# Patient Record
Sex: MALE | URBAN - METROPOLITAN AREA
[De-identification: names, ages, dates, MRNs, and addresses within clinical notes are randomized per-mention and may not be internally consistent; named-entity substitution may affect disease eponyms.]

---

## 2017-01-06 ENCOUNTER — OFFICE VISIT (OUTPATIENT)
Dept: UROLOGY | Facility: CLINIC | Age: 62
End: 2017-01-06

## 2017-01-06 VITALS
DIASTOLIC BLOOD PRESSURE: 84 MMHG | BODY MASS INDEX: 25.4 KG/M2 | WEIGHT: 171.5 LBS | HEART RATE: 44 BPM | HEIGHT: 69 IN | SYSTOLIC BLOOD PRESSURE: 144 MMHG

## 2017-01-06 DIAGNOSIS — R35.1 NOCTURIA: Primary | ICD-10-CM

## 2017-01-06 RX ORDER — ALFUZOSIN HYDROCHLORIDE 10 MG/1
10 TABLET, EXTENDED RELEASE ORAL DAILY
Qty: 90 TABLET | Refills: 1 | Status: SHIPPED | OUTPATIENT
Start: 2017-01-06 | End: 2017-07-10

## 2017-01-06 ASSESSMENT — PAIN SCALES - GENERAL: PAINLEVEL: NO PAIN (0)

## 2017-01-06 ASSESSMENT — ENCOUNTER SYMPTOMS
EYE REDNESS: 1
DOUBLE VISION: 0
EYE PAIN: 0
EYE IRRITATION: 1
DIFFICULTY URINATING: 1
EYE WATERING: 0
HEMATURIA: 0
FLANK PAIN: 0
DYSURIA: 0

## 2017-01-06 NOTE — Clinical Note
"1/6/2017       RE: Damian Brooks  [address] [place] [address]     Dear Colleague,    Thank you for referring your patient, Damian Brooks, to the [facility] UROLOGY AND INST FOR PROSTATE AND UROLOGIC CANCERS at [facility]. Please see a copy of my visit note below.    It was my pleasure to see Damian Brooks a 61 year old year old male today. Patient was seen in consultation for nocturia.    HPI:   Patient presents with a history of lower urinary tract symptoms       Nocturia 3-5x   Frequency, urgency, no UUI   Had discussed green light laser vaporization and uro-lift with previous urologist.     Patient has tried tamsulosin in the past and noted no difference in symptoms. Also did not like decreased ejaculate.   He is interested in Rezum.     We discussed TURP vs PVP vs uro-lift vs rezum.   Uro-lift and Rezum would be good options given maintenance of ejaculatory function.     We also discussed cystoscopy and possible UDS as part of work up in preparation for outlet procedure.   Patient defers UDS but will have cystoscopy closer to the time of procedure.         No past medical history on file.    No past surgical history on file.    No family history on file.    No current outpatient prescriptions on file.       ALLERGIES: Review of patient's allergies indicates no known allergies.      REVIEW OF SYSTEMS:  Skin: negative  Eyes: negative  Ears/Nose/Throat: negative  Respiratory: No shortness of breath, dyspnea on exertion, cough, or hemoptysis  Cardiovascular: negative  Gastrointestinal: negative  Genitourinary: as above  Musculoskeletal: negative  Neurologic: negative  Psychiatric: negative  Hematologic/Lymphatic/Immunologic: negative  Endocrine: negative      GENERAL PHYSICAL EXAM:   Vitals: /84 mmHg  Pulse 44  Ht 1.753 m (5' 9\")  Wt 77.792 kg (171 lb 8 oz)  BMI 25.31 kg/m2  Body mass index is 25.31 kg/(m^2).  Constitutional: healthy, alert and no " distress  Head: Normocephalic. No masses, lesions, tenderness or abnormalities  Neck: Neck supple. No adenopathy. Thyroid symmetric, normal size,, Carotids without bruits.  ENT: ENT exam normal, no neck nodes or sinus tenderness  Cardiovascular: negative  Respiratory: negative  Gastrointestinal: Abdomen soft  Musculoskeletal: extremities normal-  Skin: no suspicious lesions or rashes  Psychiatric: affect normal/bright and mentation appears normal.       EXAM:   Left Flank: negative  Right Flank: negative  Inguinal Area: normal        Urinary Flow Rate  Peak Flow: 2.8 mL/s  Average Flow: 1.1 mL/s  Voided (mL): 43 mL  Residual Volume by Ultrasound: 37 mL    RADIOLOGY: The following tests were reviewed: Need to complete the following Radiology exams prior to the office appointment:  LABS: The last test results for Needs to complete the necessary tests prior to appointment: were reviewed.     ASSESSMENT: 62 y/o M with lower urinary tract symptoms refractory to tamsulosin     PLAN:   Trial of alfuzosin   Patient will defer Rezum at present but will contact us in a few months to initiate cystoscopy and set up treatment         I spent over 30 minutes with the patient.  Over half this time was spent on counseling.          Again, thank you for allowing me to participate in the care of your patient.      Sincerely,    Liliam Merritt MD

## 2017-01-06 NOTE — NURSING NOTE
"Chief Complaint   Patient presents with     Consult     2nd opionion regarding BPH       Blood pressure 144/84, pulse 44, height 1.753 m (5' 9\"), weight 77.792 kg (171 lb 8 oz). Body mass index is 25.31 kg/(m^2).    There is no problem list on file for this patient.      No Known Allergies    No current outpatient prescriptions on file.       Social History   Substance Use Topics     Smoking status: Never Smoker      Smokeless tobacco: Never Used     Alcohol Use: Not on file       AYESHA Henao  1/6/2017  12:58 PM       "

## 2017-01-06 NOTE — PROGRESS NOTES
"It was my pleasure to see Damian Brooks a 61 year old year old male today. Patient was seen in consultation for nocturia.    HPI:   Patient presents with a history of lower urinary tract symptoms       Nocturia 3-5x   Frequency, urgency, no UUI   Had discussed green light laser vaporization and uro-lift with previous urologist.     Patient has tried tamsulosin in the past and noted no difference in symptoms. Also did not like decreased ejaculate.   He is interested in Rezum.     We discussed TURP vs PVP vs uro-lift vs rezum.   Uro-lift and Rezum would be good options given maintenance of ejaculatory function.     We also discussed cystoscopy and possible UDS as part of work up in preparation for outlet procedure.   Patient defers UDS but will have cystoscopy closer to the time of procedure.         No past medical history on file.    No past surgical history on file.    No family history on file.    No current outpatient prescriptions on file.       ALLERGIES: Review of patient's allergies indicates no known allergies.      REVIEW OF SYSTEMS:  Skin: negative  Eyes: negative  Ears/Nose/Throat: negative  Respiratory: No shortness of breath, dyspnea on exertion, cough, or hemoptysis  Cardiovascular: negative  Gastrointestinal: negative  Genitourinary: as above  Musculoskeletal: negative  Neurologic: negative  Psychiatric: negative  Hematologic/Lymphatic/Immunologic: negative  Endocrine: negative      GENERAL PHYSICAL EXAM:   Vitals: /84 mmHg  Pulse 44  Ht 1.753 m (5' 9\")  Wt 77.792 kg (171 lb 8 oz)  BMI 25.31 kg/m2  Body mass index is 25.31 kg/(m^2).  Constitutional: healthy, alert and no distress  Head: Normocephalic. No masses, lesions, tenderness or abnormalities  Neck: Neck supple. No adenopathy. Thyroid symmetric, normal size,, Carotids without bruits.  ENT: ENT exam normal, no neck nodes or sinus tenderness  Cardiovascular: negative  Respiratory: negative  Gastrointestinal: Abdomen " soft  Musculoskeletal: extremities normal-  Skin: no suspicious lesions or rashes  Psychiatric: affect normal/bright and mentation appears normal.       EXAM:   Left Flank: negative  Right Flank: negative  Inguinal Area: normal        Urinary Flow Rate  Peak Flow: 2.8 mL/s  Average Flow: 1.1 mL/s  Voided (mL): 43 mL  Residual Volume by Ultrasound: 37 mL    RADIOLOGY: The following tests were reviewed: Need to complete the following Radiology exams prior to the office appointment:  LABS: The last test results for Needs to complete the necessary tests prior to appointment: were reviewed.     ASSESSMENT: 60 y/o M with lower urinary tract symptoms refractory to tamsulosin     PLAN:   Trial of alfuzosin   Patient will defer Rezum at present but will contact us in a few months to initiate cystoscopy and set up treatment         I spent over 30 minutes with the patient.  Over half this time was spent on counseling.

## 2017-07-10 DIAGNOSIS — R35.1 NOCTURIA: ICD-10-CM

## 2017-07-11 RX ORDER — ALFUZOSIN HYDROCHLORIDE 10 MG/1
TABLET, EXTENDED RELEASE ORAL
Qty: 90 TABLET | Refills: 1 | Status: SHIPPED | OUTPATIENT
Start: 2017-07-11 | End: 2018-03-16

## 2017-10-16 ENCOUNTER — PRE VISIT (OUTPATIENT)
Dept: UROLOGY | Facility: CLINIC | Age: 62
End: 2017-10-16

## 2017-10-16 NOTE — TELEPHONE ENCOUNTER
Patient is coming in to see  for a cystoscopy for  lower urinary tract symptoms, no need for a call

## 2017-10-20 ENCOUNTER — ALLIED HEALTH/NURSE VISIT (OUTPATIENT)
Dept: UROLOGY | Facility: CLINIC | Age: 62
End: 2017-10-20

## 2017-10-20 ENCOUNTER — OFFICE VISIT (OUTPATIENT)
Dept: UROLOGY | Facility: CLINIC | Age: 62
End: 2017-10-20

## 2017-10-20 VITALS
HEIGHT: 69 IN | DIASTOLIC BLOOD PRESSURE: 76 MMHG | SYSTOLIC BLOOD PRESSURE: 124 MMHG | WEIGHT: 171.4 LBS | HEART RATE: 45 BPM | BODY MASS INDEX: 25.39 KG/M2

## 2017-10-20 DIAGNOSIS — R39.9 LOWER URINARY TRACT SYMPTOMS (LUTS): Primary | ICD-10-CM

## 2017-10-20 RX ORDER — CEFAZOLIN SODIUM 1 G/3ML
1 INJECTION, POWDER, FOR SOLUTION INTRAMUSCULAR; INTRAVENOUS SEE ADMIN INSTRUCTIONS
Status: CANCELLED | OUTPATIENT
Start: 2017-10-20

## 2017-10-20 ASSESSMENT — PAIN SCALES - GENERAL
PAINLEVEL: NO PAIN (0)
PAINLEVEL: NO PAIN (0)

## 2017-10-20 NOTE — MR AVS SNAPSHOT
After Visit Summary   10/20/2017    Damian Brooks    MRN: [id number]           Patient Information     Date Of Birth          1955        Visit Information        Provider Department      10/20/2017 3:30 PM Nurse,  Prostate [facility] for Prostate and Urologic Cancers        Today's Diagnoses     Lower urinary tract symptoms (LUTS)    -  1       Follow-ups after your visit        Your next 10 appointments already scheduled     Dec 11, 2017   Procedure with Liliam Merritt MD   [facility] ([facility])    [address] [place] [address]  [address] [facility] [address]   [phone]           Located in the Clinics and Surgery Center at [address] [place] [address].   parking is very convenient and highly recommended.  is a $6 flat rate fee.  Both  and self parkers should enter the main arrival plaza from [facility]; parking attendants will direct you based on your parking preference.            Dec 14, 2017  9:30 AM CST   (Arrive by 9:15 AM)   Return Visit with  Prostate Cancer Ctr Nurse   [facility] Prostate and Urologic Cancers ([facility])    [address] [place] [address]  [address] [facility] [address]   [phone]            Dec 22, 2017  2:30 PM CST   (Arrive by 2:15 PM)   Post-Op with Liliam Merritt MD   [facility] Prostate and Urologic Cancers ([facility])    [address] [place] [id number]   [phone]            Mar 16, 2018 11:15 AM CDT   (Arrive by 11:00 AM)   Return Visit with Liliam Merritt MD   [facility] Prostate and Urologic Cancers ([facility])    [address] [place] [id number] [phone]              Who to contact     Please call your clinic at  [phone] to:    Ask questions about your health    Make or cancel appointments    Discuss your medicines    Learn about your test results    Speak to your doctor   If you have compliments or concerns about an experience at your clinic, or if you wish to file a complaint, please contact [facility] Patient Relations at [phone] or email us at [email or web address]         Additional Information About Your Visit        [employer] Information     [employer] gives you secure access to your electronic health record. If you see a primary care provider, you can also send messages to your care team and make appointments. If you have questions, please call your primary care clinic.  If you do not have a primary care provider, please call [phone] and they will assist you.      Popcorn5 is an electronic gateway that provides easy, online access to your medical records. With [employer], you can request a clinic appointment, read your test results, renew a prescription or communicate with your care team.     To access your existing account, please contact your [facility] Clinic or call [phone] for assistance.        Care EveryWhere ID     This is your Care EveryWhere ID. This could be used by other organizations to access your [facility] medical records  [id number]         Blood Pressure from Last 3 Encounters:   10/20/17 124/76   01/06/17 144/84    Weight from Last 3 Encounters:   10/20/17 77.7 kg (171 lb 6.4 oz)   01/06/17 77.8 kg (171 lb 8 oz)              Today, you had the following     No orders found for display       Primary Care Provider Fax #    Provider Not In System [phone]                Equal Access to Services     JI WISE : Hadii chemo [place] Sofanny, waaxda luqadaha, qaybta kaalmada elza gutierrez. So [facility] [phone].    ATENCIÓN: Si habla español, tiene a caal disposición servicios gratuitos de  asistencia lingüística. Nicholas al [phone].    We comply with applicable federal civil rights laws and [place] laws. We do not discriminate on the basis of race, color, national origin, age, disability, sex, sexual orientation, or gender identity.            Thank you!     Thank you for choosing [facility] UROLOGY AND [facility] FOR PROSTATE AND UROLOGIC CANCERS  for your care. Our goal is always to provide you with excellent care. Hearing back from our patients is one way we can continue to improve our services. Please take a few minutes to complete the written survey that you may receive in the mail after your visit with us. Thank you!             Your Updated Medication List - Protect others around you: Learn how to safely use, store and throw away your medicines at www.disposemymeds.org.          This list is accurate as of: 10/20/17  3:34 PM.  Always use your most recent med list.                   Brand Name Dispense Instructions for use Diagnosis    alfuzosin 10 MG 24 hr tablet    UROXATRAL    90 tablet    TAKE ONE TABLET BY MOUTH EVERY DAY    Nocturia

## 2017-10-20 NOTE — PROGRESS NOTES
PRE-PROCEDURE DIAGNOSIS:  Lower urinary tract symptoms   POST-PROCEDURE DIAGNOSIS: lower urinary tract symptoms   PROCEDURE: Cystoscopy  HISTORY: Damian Brooks is a 62 year old male with slowed stream and lower urinary tract symptoms   REVIEW OF OFFICE STUDIES (e.g., uroflow or PVR):   DESCRIPTION OF PROCEDURE: After informed consent was obtained, the patient was brought to the procedure room where he was placed in the supine position with all pressure points well padded.  The penis and scrotum were prepped and draped in a sterile fashion. A flexible cystoscope was introduced through a well-lubricated urethra. Anterior urethra strictures were absent.   The urinary sphincter was intact.  The prostate demonstrated mild/moderate bilobar hypertrophy with high median bar, very small median lob component.  Bladder neck was open.   Bladder significant for presence of the following:      Diverticuli: absent      Cellules: absent      Trabeculation: present 0-1      Tumors: absent      Stones: absent  The flexible cystoscope was removed and the findings were described to the patient.   ASSESSMENT AND PLAN: 62 year old male with lower urinary tract symptoms and prostate amenable for Rezum.   Will schedule for Rezum next available

## 2017-10-20 NOTE — PROGRESS NOTES
Pre Op Teaching Flowsheet       Pre and Post op Patient Education  Relevant Diagnosis:  Urinary frequency  Teaching Topic:  Pre and post op teaching for cystoscopy, Rezum steam vaporization of the prostate  Person Involved in teaching:  Damian Brooks      Motivation Level:  Asks Questions: Yes  Eager to Learn:  Yes  Cooperative: Yes  Receptive (willing/able to accept information):  Yes  Patient demonstrates understanding of the following:  Date and time of surgery:  12.11.17 at 0830  Location of surgery: [facility]- 5th Floor  History and Physical and any other testing necessary prior to surgery: Yes  Required time line for completion of History and Physical and any pre-op testing: Yes    NPO Guidelines: NPO after midnight    Patient demonstrates understanding of the following:  Pre-op bowel prep: no, not needed  Pre-op showering/scrub information with Hibiclens Soap: Yes  Medications to take the day of surgery:  Per PCP  Blood thinner medications discussed and when to stop (if applicable):  Yes  Diabetes medication management (if applicable):  N/A  Discussed pain control after surgery: pain scale, pain medications and pain management techniques  Infection Prevention: Patient demonstrates understanding of the following:  Patient instructed on hand hygiene:  Yes  Surgical procedure site care taught: N/A  Signs and symptoms of infection taught:  Yes  Wound care will be taught at the time of discharge.  Central venous catheter care will be taught at the time of discharge (if applicable).    Post-op follow-up:  Discussed how to contact the hospital, nurse, and clinic scheduling staff if necessary.    Instructional materials used/given/mailed:  [facility] Booklet, post op teaching sheet, Map, Soap, and arrival/location information.    Surgical instructions given to patient in clinic: Yes.    Instructional Materials given:  Before your surgery packet , Medications to avoid  before surgery , Showering or Bathing instructions before surgery  and What to expect after surgery    Post-op appointment/testing scheduled per MD orders: Yes, 12.14.17 at 0930, [place] for catheter removal.  12.22.17 at 1430 with Dr Merritt  3.16.18 at 1115 with Dr Merritt    Total time with patient: 10 minutes    Tova Mora RN-BC, BSN  Urology Care Coordinator

## 2017-10-20 NOTE — NURSING NOTE
Invasive Procedure Safety Checklist:    Procedure:     Action: Complete sections and checkboxes as appropriate.    Pre-procedure:  1. Patient ID Verified with 2 identifiers (Sonali and  or MRN) : YES    2. Procedure and site verified with patient/designee (when able) : YES    3. Accurate consent documentation in medical record : YES    4. H&P (or appropriate assessment) documented in medical record : YES  H&P must be up to 30 days prior to procedure an updated within 24 hours of                 Procedure as applicable.     5. Relevant diagnostic and radiology test results appropriately labeled and displayed as applicable : YES    6. Blood products, implants, devices, and/or special equipment available for the procedure as applicable : YES    7. Procedure site(s) marked with provider initials [Exclusions: None] : NO    8. Marking not required. Reason : Yes  Procedure does not require site marking    Time Out:     Time-Out performed immediately prior to starting procedure, including verbal and active participation of all team members addressing: YES    1. Correct patient identity.  2. Confirmed that the correct side and site are marked.  3. An accurate procedure to be done.  4. Agreement on the procedure to be done.  5. Correct patient position.  6. Relevant images and results are properly labeled and appropriately displayed.  7. The need to administer antibiotics or fluids for irrigation purposes during the procedure as applicable.  8. Safety precautions based on patient history or medication use.    During Procedure: Verification of correct person, site, and procedure occurs any time the responsibility for care of the patient is transferred to another member of the care team.

## 2017-10-20 NOTE — MR AVS SNAPSHOT
"              After Visit Summary   10/20/2017    Damian Brooks    MRN: [id number]           Patient Information     Date Of Birth          1955        Visit Information        Provider Department      10/20/2017 1:15 PM Liliam Merritt MD [facility] for Prostate and Urologic Cancers        Today's Diagnoses     Lower urinary tract symptoms (LUTS)    -  1      Care Instructions      AFTER YOUR CYSTOSCOPY        You have just completed a cystoscopy, or \"cysto\", which allowed your physician to learn more about your bladder (or to remove a stent placed after surgery). We suggest that you continue to avoid caffeine, fruit juice, and alcohol for the next 24 hours, however, you are encouraged to return to your normal activities.         A few things that are considered normal after your cystoscopy:     * Small amount of bleeding (or spotting) that clears within the next 24 hours     * Slight burning sensation with urination     * Sensation to of needing to avoid more frequently     * The feeling of \"air\" in your urine     * Mild discomfort that is relieved with Tylenol        Please contact our office promptly if you:     * Develop a fever above 101 degrees     * Are unable to urinate     * Develop bright red blood that does not stop     * Severe pain or swelling         Please contact our office with any concerns or questions @[facility].          Follow-ups after your visit        Additional Services     PAC Visit Referral (For [facility] Only)       Does this visit require an Anesthesia consult?  No -  If this visit is not for evaluation for co-morbidity (such as patient request due to anxiety), what is the purpose of the visit?: H and P     H&P done by:  N/A      Please be aware that coverage of these services is subject to the terms and limitations of your health insurance plan.  Call member services at your health plan with any benefit or coverage questions.      Please bring the following to " your appointment:  >>   Any x-rays, CTs or MRIs which have been performed.  Contact the facility where they were done to arrange for  prior to your scheduled appointment.  Any new CT, MRI or other procedures ordered by your specialist must be performed at a [facility] or coordinated by your clinic's referral office.    >>   List of current medications  >>   This referral request   >>   Any documents/labs given to you for this referral                  Follow-up notes from your care team     Return in 12 months (on 10/20/2018).      Your next 10 appointments already scheduled     Dec 11, 2017   Procedure with Liliam Merritt MD   [facility] ([facility])    [address] [place] [address]  [address] [facility] [id number] [phone]           Located in the Clinics and Surgery Center at [address] [place] [address].   parking is very convenient and highly recommended.  is a $6 flat rate fee.  Both  and self parkers should enter the main arrival plaza from [facility]; parking attendants will direct you based on your parking preference.            Dec 14, 2017  9:30 AM CST   (Arrive by 9:15 AM)   Return Visit with  Prostate Cancer Ctr Nurse   [facility] for Prostate and Urologic Cancers ([facility])    [address] [place] [address]  [address]th [facility] [address]   [phone]            Dec 22, 2017  2:30 PM CST   (Arrive by 2:15 PM)   Post-Op with Liliam Merritt MD   [facility] for Prostate and Urologic Cancers ([facility])    [address] [place] [address]  [address] [facility] [address]   [phone]            Mar 16, 2018 11:15 AM CDT   (Arrive by 11:00 AM)   Return Visit with Liliam Merritt MD   [facility] for Prostate and Urologic Cancers ([facility])    [address] [place] [address]  "Se  4th Floor  [facility] [id number] [phone]              Who to contact     Please call your clinic at [phone] to:    Ask questions about your health    Make or cancel appointments    Discuss your medicines    Learn about your test results    Speak to your doctor   If you have compliments or concerns about an experience at your clinic, or if you wish to file a complaint, please contact [facility] Patient Relations at [phone] or email us at [email or web address]         Additional Information About Your Visit        [employer] Information     [employer] gives you secure access to your electronic health record. If you see a primary care provider, you can also send messages to your care team and make appointments. If you have questions, please call your primary care clinic.  If you do not have a primary care provider, please call [phone] and they will assist you.      [employer] is an electronic gateway that provides easy, online access to your medical records. With [employer], you can request a clinic appointment, read your test results, renew a prescription or communicate with your care team.     To access your existing account, please contact your [facility] Clinic or call [phone] for assistance.        Care EveryWhere ID     This is your Care EveryWhere ID. This could be used by other organizations to access your [place] medical records  [id number]        Your Vitals Were     Pulse Height BMI (Body Mass Index)             45 1.753 m (5' 9\") 25.31 kg/m2          Blood Pressure from Last 3 Encounters:   10/20/17 124/76   01/06/17 144/84    Weight from Last 3 Encounters:   10/20/17 77.7 kg (171 lb 6.4 oz)   01/06/17 77.8 kg (171 lb 8 oz)              We Performed the Following     CYSTOURETHROSCOPY     PAC Visit Referral (For [facility] Only)     Zulema-Operative Worksheet  (Urology General)        Primary Care Provider Fax #    Provider Not " In System [phone]                Equal Access to Services     RAÚLDEE FRANDY : Hadii aad ku hadanantshahriar Jesus, glenroy cortés, kielza gaytan. So [facility] [phone].    ATENCIÓN: Si habla español, tiene a caal disposición servicios gratuitos de asistencia lingüística. Llame al [phone].    We comply with applicable federal civil rights laws and [place] laws. We do not discriminate on the basis of race, color, national origin, age, disability, sex, sexual orientation, or gender identity.            Thank you!     Thank you for choosing [facility] AND [facility] FOR PROSTATE AND UROLOGIC CANCERS  for your care. Our goal is always to provide you with excellent care. Hearing back from our patients is one way we can continue to improve our services. Please take a few minutes to complete the written survey that you may receive in the mail after your visit with us. Thank you!             Your Updated Medication List - Protect others around you: Learn how to safely use, store and throw away your medicines at www.disposemymeds.org.          This list is accurate as of: 10/20/17  4:57 PM.  Always use your most recent med list.                   Brand Name Dispense Instructions for use Diagnosis    alfuzosin 10 MG 24 hr tablet    UROXATRAL    90 tablet    TAKE ONE TABLET BY MOUTH EVERY DAY    Nocturia

## 2017-10-20 NOTE — LETTER
10/20/2017       RE: Damian Brooks  [address] [place] [address]     Dear Colleague,    Thank you for referring your patient, Damian Brooks, to the [facility] UROLOGY AND INST FOR PROSTATE AND UROLOGIC CANCERS at [facility]. Please see a copy of my visit note below.      PRE-PROCEDURE DIAGNOSIS:  Lower urinary tract symptoms   POST-PROCEDURE DIAGNOSIS: lower urinary tract symptoms   PROCEDURE: Cystoscopy  HISTORY: Damian Brooks is a 62 year old male with slowed stream and lower urinary tract symptoms   REVIEW OF OFFICE STUDIES (e.g., uroflow or PVR):   DESCRIPTION OF PROCEDURE: After informed consent was obtained, the patient was brought to the procedure room where he was placed in the supine position with all pressure points well padded.  The penis and scrotum were prepped and draped in a sterile fashion. A flexible cystoscope was introduced through a well-lubricated urethra. Anterior urethra strictures were absent.   The urinary sphincter was intact.  The prostate demonstrated mild/moderate bilobar hypertrophy with high median bar, very small median lob component.  Bladder neck was open.   Bladder significant for presence of the following:      Diverticuli: absent      Cellules: absent      Trabeculation: present 0-1      Tumors: absent      Stones: absent  The flexible cystoscope was removed and the findings were described to the patient.   ASSESSMENT AND PLAN: 62 year old male with lower urinary tract symptoms and prostate amenable for Rezum.   Will schedule for Rezum next available     Again, thank you for allowing me to participate in the care of your patient.      Sincerely,    Liliam Merritt MD

## 2017-11-29 ENCOUNTER — PRE VISIT (OUTPATIENT)
Dept: UROLOGY | Facility: CLINIC | Age: 62
End: 2017-11-29

## 2017-11-29 NOTE — TELEPHONE ENCOUNTER
Patient is coming in to see  for a 2 week post rezume, called patient and left a message to please come with a full bladder

## 2017-12-04 DIAGNOSIS — R30.0 DYSURIA: Primary | ICD-10-CM

## 2017-12-04 RX ORDER — CIPROFLOXACIN 500 MG/1
500 TABLET, FILM COATED ORAL 2 TIMES DAILY
Qty: 6 TABLET | Refills: 0 | Status: SHIPPED | OUTPATIENT
Start: 2017-12-04 | End: 2018-03-16

## 2017-12-08 ENCOUNTER — ANESTHESIA EVENT (OUTPATIENT)
Dept: SURGERY | Facility: AMBULATORY SURGERY CENTER | Age: 62
End: 2017-12-08

## 2017-12-11 ENCOUNTER — ANESTHESIA (OUTPATIENT)
Dept: SURGERY | Facility: AMBULATORY SURGERY CENTER | Age: 62
End: 2017-12-11

## 2017-12-11 ENCOUNTER — HOSPITAL ENCOUNTER (OUTPATIENT)
Facility: AMBULATORY SURGERY CENTER | Age: 62
End: 2017-12-11
Attending: UROLOGY

## 2017-12-11 ENCOUNTER — SURGERY (OUTPATIENT)
Age: 62
End: 2017-12-11

## 2017-12-11 VITALS
RESPIRATION RATE: 16 BRPM | BODY MASS INDEX: 24.44 KG/M2 | HEART RATE: 48 BPM | OXYGEN SATURATION: 98 % | WEIGHT: 165 LBS | DIASTOLIC BLOOD PRESSURE: 70 MMHG | SYSTOLIC BLOOD PRESSURE: 112 MMHG | HEIGHT: 69 IN | TEMPERATURE: 98 F

## 2017-12-11 DIAGNOSIS — N40.1 BENIGN PROSTATIC HYPERPLASIA WITH LOWER URINARY TRACT SYMPTOMS, SYMPTOM DETAILS UNSPECIFIED: Primary | ICD-10-CM

## 2017-12-11 RX ORDER — FENTANYL CITRATE 50 UG/ML
INJECTION, SOLUTION INTRAMUSCULAR; INTRAVENOUS PRN
Status: DISCONTINUED | OUTPATIENT
Start: 2017-12-11 | End: 2017-12-11

## 2017-12-11 RX ORDER — ACETAMINOPHEN 325 MG/1
975 TABLET ORAL ONCE
Status: COMPLETED | OUTPATIENT
Start: 2017-12-11 | End: 2017-12-11

## 2017-12-11 RX ORDER — LIDOCAINE HYDROCHLORIDE 20 MG/ML
INJECTION, SOLUTION INFILTRATION; PERINEURAL PRN
Status: DISCONTINUED | OUTPATIENT
Start: 2017-12-11 | End: 2017-12-11

## 2017-12-11 RX ORDER — ONDANSETRON 2 MG/ML
INJECTION INTRAMUSCULAR; INTRAVENOUS PRN
Status: DISCONTINUED | OUTPATIENT
Start: 2017-12-11 | End: 2017-12-11

## 2017-12-11 RX ORDER — PROPOFOL 10 MG/ML
INJECTION, EMULSION INTRAVENOUS PRN
Status: DISCONTINUED | OUTPATIENT
Start: 2017-12-11 | End: 2017-12-11

## 2017-12-11 RX ORDER — ONDANSETRON 2 MG/ML
4 INJECTION INTRAMUSCULAR; INTRAVENOUS EVERY 30 MIN PRN
Status: DISCONTINUED | OUTPATIENT
Start: 2017-12-11 | End: 2017-12-12 | Stop reason: HOSPADM

## 2017-12-11 RX ORDER — OXYCODONE HYDROCHLORIDE 5 MG/1
5 TABLET ORAL EVERY 4 HOURS PRN
Qty: 10 TABLET | Refills: 0 | Status: SHIPPED | OUTPATIENT
Start: 2017-12-11 | End: 2018-03-16

## 2017-12-11 RX ORDER — FENTANYL CITRATE 50 UG/ML
25-50 INJECTION, SOLUTION INTRAMUSCULAR; INTRAVENOUS
Status: DISCONTINUED | OUTPATIENT
Start: 2017-12-11 | End: 2017-12-11 | Stop reason: HOSPADM

## 2017-12-11 RX ORDER — NALOXONE HYDROCHLORIDE 0.4 MG/ML
.1-.4 INJECTION, SOLUTION INTRAMUSCULAR; INTRAVENOUS; SUBCUTANEOUS
Status: DISCONTINUED | OUTPATIENT
Start: 2017-12-11 | End: 2017-12-12 | Stop reason: HOSPADM

## 2017-12-11 RX ORDER — SODIUM CHLORIDE, SODIUM LACTATE, POTASSIUM CHLORIDE, CALCIUM CHLORIDE 600; 310; 30; 20 MG/100ML; MG/100ML; MG/100ML; MG/100ML
INJECTION, SOLUTION INTRAVENOUS CONTINUOUS
Status: DISCONTINUED | OUTPATIENT
Start: 2017-12-11 | End: 2017-12-11 | Stop reason: HOSPADM

## 2017-12-11 RX ORDER — PROPOFOL 10 MG/ML
INJECTION, EMULSION INTRAVENOUS CONTINUOUS PRN
Status: DISCONTINUED | OUTPATIENT
Start: 2017-12-11 | End: 2017-12-11

## 2017-12-11 RX ORDER — SODIUM CHLORIDE, SODIUM LACTATE, POTASSIUM CHLORIDE, CALCIUM CHLORIDE 600; 310; 30; 20 MG/100ML; MG/100ML; MG/100ML; MG/100ML
INJECTION, SOLUTION INTRAVENOUS CONTINUOUS
Status: DISCONTINUED | OUTPATIENT
Start: 2017-12-11 | End: 2017-12-12 | Stop reason: HOSPADM

## 2017-12-11 RX ORDER — GABAPENTIN 300 MG/1
300 CAPSULE ORAL ONCE
Status: COMPLETED | OUTPATIENT
Start: 2017-12-11 | End: 2017-12-11

## 2017-12-11 RX ORDER — OXYCODONE HYDROCHLORIDE 5 MG/1
5 TABLET ORAL EVERY 4 HOURS PRN
Qty: 10 TABLET | Refills: 0 | Status: SHIPPED | OUTPATIENT
Start: 2017-12-11 | End: 2017-12-11

## 2017-12-11 RX ORDER — MEPERIDINE HYDROCHLORIDE 25 MG/ML
12.5 INJECTION INTRAMUSCULAR; INTRAVENOUS; SUBCUTANEOUS
Status: DISCONTINUED | OUTPATIENT
Start: 2017-12-11 | End: 2017-12-12 | Stop reason: HOSPADM

## 2017-12-11 RX ORDER — LIDOCAINE 40 MG/G
CREAM TOPICAL
Status: DISCONTINUED | OUTPATIENT
Start: 2017-12-11 | End: 2017-12-11 | Stop reason: HOSPADM

## 2017-12-11 RX ORDER — ONDANSETRON 4 MG/1
4 TABLET, ORALLY DISINTEGRATING ORAL EVERY 30 MIN PRN
Status: DISCONTINUED | OUTPATIENT
Start: 2017-12-11 | End: 2017-12-12 | Stop reason: HOSPADM

## 2017-12-11 RX ORDER — GLYCOPYRROLATE 0.2 MG/ML
INJECTION, SOLUTION INTRAMUSCULAR; INTRAVENOUS PRN
Status: DISCONTINUED | OUTPATIENT
Start: 2017-12-11 | End: 2017-12-11

## 2017-12-11 RX ADMIN — PROPOFOL 30 MG: 10 INJECTION, EMULSION INTRAVENOUS at 09:17

## 2017-12-11 RX ADMIN — GLYCOPYRROLATE 0.2 MG: 0.2 INJECTION, SOLUTION INTRAMUSCULAR; INTRAVENOUS at 08:53

## 2017-12-11 RX ADMIN — PROPOFOL 20 MG: 10 INJECTION, EMULSION INTRAVENOUS at 09:20

## 2017-12-11 RX ADMIN — FENTANYL CITRATE 50 MCG: 50 INJECTION, SOLUTION INTRAMUSCULAR; INTRAVENOUS at 08:40

## 2017-12-11 RX ADMIN — FENTANYL CITRATE 25 MCG: 50 INJECTION, SOLUTION INTRAMUSCULAR; INTRAVENOUS at 09:17

## 2017-12-11 RX ADMIN — ONDANSETRON 4 MG: 2 INJECTION INTRAMUSCULAR; INTRAVENOUS at 08:41

## 2017-12-11 RX ADMIN — SODIUM CHLORIDE, SODIUM LACTATE, POTASSIUM CHLORIDE, CALCIUM CHLORIDE: 600; 310; 30; 20 INJECTION, SOLUTION INTRAVENOUS at 07:45

## 2017-12-11 RX ADMIN — SODIUM CHLORIDE, SODIUM LACTATE, POTASSIUM CHLORIDE, CALCIUM CHLORIDE: 600; 310; 30; 20 INJECTION, SOLUTION INTRAVENOUS at 08:37

## 2017-12-11 RX ADMIN — ACETAMINOPHEN 975 MG: 325 TABLET ORAL at 07:45

## 2017-12-11 RX ADMIN — PROPOFOL 100 MCG/KG/MIN: 10 INJECTION, EMULSION INTRAVENOUS at 08:40

## 2017-12-11 RX ADMIN — GABAPENTIN 300 MG: 300 CAPSULE ORAL at 07:45

## 2017-12-11 RX ADMIN — PROPOFOL 40 MG: 10 INJECTION, EMULSION INTRAVENOUS at 08:40

## 2017-12-11 RX ADMIN — LIDOCAINE HYDROCHLORIDE 50 MG: 20 INJECTION, SOLUTION INFILTRATION; PERINEURAL at 08:51

## 2017-12-11 RX ADMIN — PROPOFOL 30 MG: 10 INJECTION, EMULSION INTRAVENOUS at 08:44

## 2017-12-11 RX ADMIN — FENTANYL CITRATE 25 MCG: 50 INJECTION, SOLUTION INTRAMUSCULAR; INTRAVENOUS at 09:04

## 2017-12-11 NOTE — ANESTHESIA PREPROCEDURE EVALUATION
Anesthesia Evaluation     .             ROS/MED HX    ENT/Pulmonary:  - neg pulmonary ROS     Neurologic:  - neg neurologic ROS     Cardiovascular:  - neg cardiovascular ROS   (+) ----. : . . . :. . No previous cardiac testing       METS/Exercise Tolerance:  >4 METS   Hematologic:  - neg hematologic  ROS       Musculoskeletal:  - neg musculoskeletal ROS       GI/Hepatic:     (+) GERD       Renal/Genitourinary:     (+) BPH,       Endo:  - neg endo ROS       Psychiatric:  - neg psychiatric ROS       Infectious Disease:  - neg infectious disease ROS       Malignancy:      - no malignancy   Other:    - neg other ROS                                Anesthesia Plan      History & Physical Review  History and physical reviewed and following examination; no interval change.    ASA Status:  2 .        Plan for MAC with Propofol and Intravenous induction. Maintenance will be TIVA.  Reason for MAC:  Deep or markedly invasive procedure (G8)  PONV prophylaxis:  Ondansetron (or other 5HT-3) and Dexamethasone or Solumedrol  Patient seen and examined by me, agree with above assessment and plan.  Chepe Boyle MD    12/11/2017 8:47 AM        Postoperative Care  Postoperative pain management:  IV analgesics, Oral pain medications and Multi-modal analgesia.      Consents  Anesthetic plan, risks, benefits and alternatives discussed with:  Patient..                          .

## 2017-12-11 NOTE — ANESTHESIA CARE TRANSFER NOTE
Patient: Damian Brooks    Procedure(s):  Cystoscopy, Rezum Steam Vaporization of the Prostate - Wound Class: II-Clean Contaminated   - Wound Class: II-Clean Contaminated    Diagnosis: Urinary Frequency  Diagnosis Additional Information: No value filed.    Anesthesia Type:   MAC     Note:  Airway :Room Air  Patient transferred to:Phase II  Comments: Arrive Phase II, Stable, Airway Intact  118/64, 41,16,96%  All questions answered.  Handoff Report: Identifed the Patient, Identified the Reponsible Provider, Reviewed the pertinent medical history, Discussed the surgical course, Reviewed Intra-OP anesthesia mangement and issues during anesthesia, Set expectations for post-procedure period and Allowed opportunity for questions and acknowledgement of understanding      Vitals: (Last set prior to Anesthesia Care Transfer)    CRNA VITALS  12/11/2017 0859 - 12/11/2017 0931      12/11/2017             Pulse: (!)  36    SpO2: 98 %    Resp Rate (set): 10                Electronically Signed By: HELEN Neil CRNA  December 11, 2017  9:31 AM

## 2017-12-11 NOTE — OP NOTE
Preprocedure Diagnosis:  BPH with Lower Urinary Tract Symptoms  Postprocedure Diagnosis: Same  Procedure:  1) Transrectal Ultrasound  2) Needle injection of periprostatic nerves  3) Cystoscopy with Rezum BPH treatment.       Staff Surgeon: Liliam Merritt MD, present for entire case   Resident: Jameson Fernandez MD    HISTORY OF PRESENT ILLNESS: Mr. Brooks is a 62-year-old gentleman followed in our clinic for history of BPH with lower urinary tract symptoms. The patient has been previously worked up for BPH and has cystoscopic evidence of prostatic obstruction and enlargement.  We have previously discussed available treatment options and he has chosen to proceed with Rezum BPH treatment for which he presents today.  We have discussed the risks, benefits and alternatives and all of his questions have been answered.        DESCRIPTION OF PROCEDURE: After informed consent was obtained and after confirming that he had been on appropriate pre-procedure antibiotics he was placed in lithotomy position. He was prepped and draped in usual sterile fashion.   The Rezum device was then lubricated and placed into the patient's urethra and cystoscopy was performed into the bladder. Scope revealed lateral lobe hypertrophy and high bladder neck. We then calculated a distance from the bladder neck to the verumontanum of approximately 4-5 cm. We performed 3 Rezum treatments in left lateral lobe at approximately 1 cm increments and 2 in right lateral lobe. We 1 another treatment in the median lobe a centimeter from the bladder neck. The patient tolerated the procedure without difficulty. A 18-Lebanese coude catheter was then placed into the patient's bladder and the balloon inflated with 10 cc of water. The catheter was secured to the leg with a cath secure. The patient will come in for a followup and trial of void in 3 days.

## 2017-12-11 NOTE — IP AVS SNAPSHOT
MRN:[id number]                      After Visit Summary   12/11/2017    Damian Brooks    MRN: [id number]           Thank you!     Thank you for choosing [facility] for your care. Our goal is always to provide you with excellent care. Hearing back from our patients is one way we can continue to improve our services. Please take a few minutes to complete the written survey that you may receive in the mail after you visit with us. Thank you!        Patient Information     Date Of Birth          1955        About your hospital stay     You were admitted on:  December 11, 2017 [facility]    You were discharged on:  December 11, 2017       Who to Call     For medical emergencies, please call 911.  For non-urgent questions about your medical care, please call your primary care provider or clinic, None  For questions related to your surgery, please call your surgery clinic        Attending Provider     Provider Liliam Valero MD Urology       Primary Care Provider Fax #    Provider Not In System [phone]      After Care Instructions     Discharge Instructions       Activity  - No strenuous exercise for1 weeks.  - No lifting, pushing, pulling more than 10 pounds for 1 week.   - Do not strain with bowel movements.  - Do not drive until you can press the brake pedal quickly and fully without pain.   - Do not operate a motor vehicle while taking narcotic pain medications.     Urination   You are going home with the following tubes or drains: mercado catheter.  Nurse/ to write care and instructions.  Treat the catheter like an extension of your body; do not let it get caught or snagged on anything.  Leave the catheter in place until your follow up. Call the numbers listed above for any concerns.   - Catheter to be removed in clinic     Medications  - Transition from narcotic pain medications to tylenol  "(acetaminophen) as you are able.  Wean yourself off all pain medications as you are able.  - Some pain medications contain both tylenol (acetaminophen) and a narcotic (Norco, vicodin, percocet), do not take more than 4,000mg of Tylenol (acetaminophen) from all sources in any 24 hour period.  - Narcotics can make you constipated.  Take over the counter fiber (metamucil or benefiber) and stool softeners (miralax, docusate or senna) while taking narcotic pain medications, but stop if you develop diarrhea.  - No driving or operating machinery while taking narcotic pain medications     Follow-Up:  - Call your primary care provider to touch base regarding your recent admission.    - Call or return sooner than your regularly scheduled visit if you develop any of the following: fever (greater than 101.5), uncontrolled pain, uncontrolled nausea or vomiting, as well as increased redness, swelling, or drainage from your wound.     Phone numbers:   - Monday through Friday 8am to 4:30pm: Call [phone] with questions or to schedule or confirm appointment.    - Nights or weekends: call the after hours emergency pager - [phone] and tell the  \"I would like to page the Urology Resident on call.\"  - For emergencies, call [phone]                  Your next 10 appointments already scheduled     Dec 14, 2017  9:30 AM CST   (Arrive by 9:15 AM)   Return Visit with  Prostate Cancer Ctr Nurse   [facility] and [facility] for Prostate and Urologic Cancers ([facility])    [address] [place] [id number] [phone]            Dec 22, 2017  2:30 PM CST   (Arrive by 2:15 PM)   Post-Op with Liliam Merritt MD   [facility] and [facility] for Prostate and Urologic Cancers ([facility])    [address] [id number] [phone]            Mar 16, 2018 11:15 AM CDT   (Arrive by 11:00 AM)   Return Visit with Liliam" Adela Merritt MD   [facility] Inst for Prostate and Urologic Cancers ([facility])    [address] [facility]  4th Floor  [facility] [address] [phone]              Further instructions from your care team       [facility]  Home Care Following Anesthesia  For 24 hours after surgery:  1. Get plenty of rest.  A responsible adult must stay with you for at least 24 hours after you leave the surgery center.  2. Do not drive or use heavy equipment.  If you have weakness or tingling, don't drive or use heavy equipment until this feeling goes away.   3. Do not drink alcohol.   4. Avoid strenuous or risky activities.  Ask for help when climbing stairs.  5. You may feel lightheaded.  IF so, sit for a few minutes before standing.  Have someone help you get up.   6. If you have nausea (feel sick to your stomach): Drink only clear liquids such as apple juice, ginger ale, broth or 7-Up.  Rest may also help.  Be sure to drink enough fluids.  Move to a regular diet as you feel able.   7. You may have a slight fever.  Call the doctor if your fever is over 100 F (37.7 C) (taken under the tongue) or lasts longer than 24 hours.  8. You may have a dry mouth, a sore throat, muscle aches or trouble sleeping. These should go away after 24 hours.  9. Do not make important or legal decisions.               Tips for taking pain medications  To get the best pain relief possible, remember these points:    Take pain medications as directed, before pain becomes severe.    Pain medication can upset your stomach: taking it with food may help.    Constipation is a common side effect of pain medication. Drink plenty of  fluids.    Eat foods high in fiber. Take a stool softener if recommended by your doctor or pharmacist.    Do not drink alcohol, drive or operate machinery while taking pain medications.    Ask about other ways to control pain, such as with heat, ice  or relaxation.    Tylenol/Acetaminophen Consumption  To help encourage the safe use of acetaminophen, the makers of TYLENOL  have lowered the maximum daily dose for single-ingredient Extra Strength TYLENOL  (acetaminophen) products sold in the [place] from 8 pills per day (4,000 mg) to 6 pills per day (3,000 mg). The dosing interval has also changed from 2 pills every 4-6 hours to 2 pills every 6 hours.    If you feel your pain relief is insufficient, you may take Tylenol/Acetaminophen in addition to your narcotic pain medication.     Be careful not to exceed 3,000 mg of Tylenol/Acetaminophen in a 24 hour period from all sources.    If you are taking extra strength Tylenol/acetaminophen (500 mg), the maximum dose is 6 tablets in 24 hours.    If you are taking regular strength acetaminophen (325 mg), the maximum dose is 9 tablets in 24 hours.    Call a doctor for any of the followin. Signs of infection (fever, growing tenderness at the surgery site, a large amount of drainage or bleeding, severe pain, foul-smelling drainage, redness, swelling).  2. It has been over 8 to 10 hours since surgery and you are still not able to urinate (pass water).  3. Headache for over 24 hours.  4. Numbness, tingling or weakness the day after surgery (if you had spinal anesthesia).  Your doctor is:       Dr. Liliam Merritt, Prostate and Urology: [phone]               Or dial [phone] and ask for the resident on call for:  Prostate Urology  For emergency care, call the:  [place] [facility]:  [phone] (TTY for hearing impaired: [phone])                Pending Results     No orders found from 2017 to 2017.            Admission Information     Date & Time Provider Department Dept. Phone    2017 Liliam Merritt MD [facility] [phone]      Your Vitals Were     Blood Pressure Pulse Temperature Respirations Height Weight    118/64 48 98  F (36.7  C)  "(Temporal) 16 1.753 m (5' 9\") 74.8 kg (165 lb)    Pulse Oximetry BMI (Body Mass Index)                96% 24.37 kg/m2          Hydrobolt Information     [employer]bolt gives you secure access to your electronic health record. If you see a primary care provider, you can also send messages to your care team and make appointments. If you have questions, please call your primary care clinic.  If you do not have a primary care provider, please call [phone] and they will assist you.      Hydrobolt is an electronic gateway that provides easy, online access to your medical records. With [employer], you can request a clinic appointment, read your test results, renew a prescription or communicate with your care team.     To access your existing account, please contact your [facility] or call [phone] for assistance.        Care EveryWhere ID     This is your Care EveryWhere ID. This could be used by other organizations to access your [place] medical records  [id number]        Equal Access to Services     JI WISE : Hadii aad ku hadasho Sorubyali, waaxda luqadaha, qaybta kaalmada adeegyada, elza smallwood . So [facility] [phone].    ATENCIÓN: Si habla español, tiene a caal disposición servicios gratuitos de asistencia lingüística. Llame al [phone].    We comply with applicable federal civil rights laws and [place] laws. We do not discriminate on the basis of race, color, national origin, age, disability, sex, sexual orientation, or gender identity.               Review of your medicines      START taking        Dose / Directions    lidocaine 2 %   Commonly known as:  URO-JET   Used for:  Benign prostatic hyperplasia with lower urinary tract symptoms, symptom details unspecified        Dose:  10 mL   Place 10 mLs into the urethra 2 times daily as needed for moderate pain   Quantity:  5 Syringe   Refills:  0       oxyCODONE IR 5 MG tablet   Commonly known as:  " ROXICODONE   Used for:  Benign prostatic hyperplasia with lower urinary tract symptoms, symptom details unspecified        Dose:  5 mg   Take 1 tablet (5 mg) by mouth every 4 hours as needed for pain   Quantity:  10 tablet   Refills:  0         CONTINUE these medicines which have NOT CHANGED        Dose / Directions    alfuzosin 10 MG 24 hr tablet   Commonly known as:  UROXATRAL   Used for:  Nocturia        TAKE ONE TABLET BY MOUTH EVERY DAY   Quantity:  90 tablet   Refills:  1       ciprofloxacin 500 MG tablet   Commonly known as:  CIPRO   Used for:  Dysuria        Dose:  500 mg   Take 1 tablet (500 mg) by mouth 2 times daily   Quantity:  6 tablet   Refills:  0            Where to get your medicines      These medications were sent to [facility] [address] [facility] [address]  [address] [place] [address] [address] [place] [address]    Hours:  TRANSPLANT PHONE NUMBER [phone] Phone:  [phone]     lidocaine 2 %         Some of these will need a paper prescription and others can be bought over the counter. Ask your nurse if you have questions.     Bring a paper prescription for each of these medications     oxyCODONE IR 5 MG tablet                Protect others around you: Learn how to safely use, store and throw away your medicines at www.disposemymeds.org.             Medication List: This is a list of all your medications and when to take them. Check marks below indicate your daily home schedule. Keep this list as a reference.      Medications           Morning Afternoon Evening Bedtime As Needed    alfuzosin 10 MG 24 hr tablet   Commonly known as:  UROXATRAL   TAKE ONE TABLET BY MOUTH EVERY DAY                                ciprofloxacin 500 MG tablet   Commonly known as:  CIPRO   Take 1 tablet (500 mg) by mouth 2 times daily                                lidocaine 2 %   Commonly known as:  URO-JET   Place 10 mLs into the urethra 2 times daily as needed for moderate pain    Last time this was given:  10 mLs on 12/11/2017  9:32 AM                                oxyCODONE IR 5 MG tablet   Commonly known as:  ROXICODONE   Take 1 tablet (5 mg) by mouth every 4 hours as needed for pain

## 2017-12-11 NOTE — ANESTHESIA POSTPROCEDURE EVALUATION
Patient: Damian Brooks    Procedure(s):  Cystoscopy, Rezum Steam Vaporization of the Prostate - Wound Class: II-Clean Contaminated   - Wound Class: II-Clean Contaminated    Diagnosis:Urinary Frequency  Diagnosis Additional Information: No value filed.    Anesthesia Type:  MAC    Note:  Anesthesia Post Evaluation    Patient location during evaluation: bedside  Patient participation: Able to fully participate in evaluation  Level of consciousness: awake  Pain management: adequate  Airway patency: patent  Cardiovascular status: acceptable  Respiratory status: acceptable  Hydration status: acceptable  PONV: controlled     Anesthetic complications: None          Last vitals:  Vitals:    12/11/17 0935 12/11/17 0942 12/11/17 1002   BP: 118/64 114/70 112/70   Pulse:      Resp: 16 16 16   Temp: 36.7  C (98  F) 36.7  C (98  F) 36.7  C (98  F)   SpO2: 96% 95% 98%         Electronically Signed By: Chepe Boyle MD, MD  December 11, 2017  10:26 AM

## 2017-12-11 NOTE — IP AVS SNAPSHOT
[facility]    [address] [place] [address]    Phone:  [phone]    Fax:  [phone]                                       After Visit Summary   12/11/2017    Damian Brooks    MRN: [id number]           After Visit Summary Signature Page     I have received my discharge instructions, and my questions have been answered. I have discussed any challenges I see with this plan with the nurse or doctor.    ..........................................................................................................................................  Patient/Patient Representative Signature      ..........................................................................................................................................  Patient Representative Print Name and Relationship to Patient    ..................................................               ................................................  Date                                            Time    ..........................................................................................................................................  Reviewed by Signature/Title    ...................................................              ..............................................  Date                                                            Time

## 2017-12-11 NOTE — ADDENDUM NOTE
Addendum  created 12/11/17 1108 by Bethel Montiel APRN CRNA    Anesthesia Intra Meds edited, Orders acknowledged in Narrator

## 2017-12-11 NOTE — DISCHARGE INSTRUCTIONS
[facility]  Home Care Following Anesthesia  For 24 hours after surgery:  1. Get plenty of rest.  A responsible adult must stay with you for at least 24 hours after you leave the surgery center.  2. Do not drive or use heavy equipment.  If you have weakness or tingling, don't drive or use heavy equipment until this feeling goes away.   3. Do not drink alcohol.   4. Avoid strenuous or risky activities.  Ask for help when climbing stairs.  5. You may feel lightheaded.  IF so, sit for a few minutes before standing.  Have someone help you get up.   6. If you have nausea (feel sick to your stomach): Drink only clear liquids such as apple juice, ginger ale, broth or 7-Up.  Rest may also help.  Be sure to drink enough fluids.  Move to a regular diet as you feel able.   7. You may have a slight fever.  Call the doctor if your fever is over 100 F (37.7 C) (taken under the tongue) or lasts longer than 24 hours.  8. You may have a dry mouth, a sore throat, muscle aches or trouble sleeping. These should go away after 24 hours.  9. Do not make important or legal decisions.               Tips for taking pain medications  To get the best pain relief possible, remember these points:    Take pain medications as directed, before pain becomes severe.    Pain medication can upset your stomach: taking it with food may help.    Constipation is a common side effect of pain medication. Drink plenty of  fluids.    Eat foods high in fiber. Take a stool softener if recommended by your doctor or pharmacist.    Do not drink alcohol, drive or operate machinery while taking pain medications.    Ask about other ways to control pain, such as with heat, ice or relaxation.    Tylenol/Acetaminophen Consumption  To help encourage the safe use of acetaminophen, the makers of TYLENOL  have lowered the maximum daily dose for single-ingredient Extra Strength TYLENOL  (acetaminophen) products sold in the [place] from 8  pills per day (4,000 mg) to 6 pills per day (3,000 mg). The dosing interval has also changed from 2 pills every 4-6 hours to 2 pills every 6 hours.    If you feel your pain relief is insufficient, you may take Tylenol/Acetaminophen in addition to your narcotic pain medication.     Be careful not to exceed 3,000 mg of Tylenol/Acetaminophen in a 24 hour period from all sources.    If you are taking extra strength Tylenol/acetaminophen (500 mg), the maximum dose is 6 tablets in 24 hours.    If you are taking regular strength acetaminophen (325 mg), the maximum dose is 9 tablets in 24 hours.    Call a doctor for any of the followin. Signs of infection (fever, growing tenderness at the surgery site, a large amount of drainage or bleeding, severe pain, foul-smelling drainage, redness, swelling).  2. It has been over 8 to 10 hours since surgery and you are still not able to urinate (pass water).  3. Headache for over 24 hours.  4. Numbness, tingling or weakness the day after surgery (if you had spinal anesthesia).  Your doctor is:       Dr. Liliam Merritt, Prostate and Urology: [phone]               Or dial [phone] and ask for the resident on call for:  Prostate Urology  For emergency care, call the:  [place] [facility]:  [phone] (TTY for hearing impaired: [phone])

## 2017-12-14 ENCOUNTER — ALLIED HEALTH/NURSE VISIT (OUTPATIENT)
Dept: UROLOGY | Facility: CLINIC | Age: 62
End: 2017-12-14
Payer: COMMERCIAL

## 2017-12-14 ENCOUNTER — CARE COORDINATION (OUTPATIENT)
Dept: UROLOGY | Facility: CLINIC | Age: 62
End: 2017-12-14

## 2017-12-14 DIAGNOSIS — N40.0 BPH (BENIGN PROSTATIC HYPERPLASIA): Primary | ICD-10-CM

## 2017-12-14 NOTE — PROGRESS NOTES
Damian Brooks comes into clinic today at the request of Dr. Liliam Merritt Ordering Provider for TOV (trial of void).    Damian Brooks presented today for a trial of void.   Approximately 200 mL of normal saline instilled into bladder via catheter.  Patient stated He had urge to urinate and catheter was removed without difficulty.  Patient was given a urinal to measure urine output.  Patient voided approximately 180 mL of pink urine. PVR was 45 ml.  Patient did tolerate procedure well.   Ciprofloxacin 500 mg given per protocol.  Teaching done with patient verbally as where to call or go if pain, fever, or unable to urinate post catheter removal.    This service provided today was under the supervising provider of the day Dr. Deluca, who was available if needed.    Caro Kuhn RN  12/14/2017  9:29 AM

## 2017-12-14 NOTE — PROGRESS NOTES
Spoke with patient while her in the clinic today for TOV. He is doing fine following Rezum procedure. BM's are normal. Appetite is normal. Denies pain. He is having catheter removed now. He has a follow up set up with Dr. Merritt. Elsa Aguirre RN

## 2017-12-14 NOTE — MR AVS SNAPSHOT
After Visit Summary   12/14/2017    Damian Brooks    MRN: [id number]           Patient Information     Date Of Birth          1955        Visit Information        Provider Department      12/14/2017 9:30 AM Nurse, [facility] and Inst for Prostate and Urologic Cancers        Today's Diagnoses     BPH (benign prostatic hyperplasia)    -  1       Follow-ups after your visit        Your next 10 appointments already scheduled     Dec 22, 2017  2:30 PM CST   (Arrive by 2:15 PM)   Post-Op with Liliam Merritt MD   [facility]rology and [facility] for Prostate and Urologic Cancers ([facility])    [address] [place] [address] [phone]            Mar 16, 2018 11:15 AM CDT   (Arrive by 11:00 AM)   Return Visit with Liliam Merritt MD   [facility]rology and [facility] for Prostate and Urologic Cancers ([facility])    [address] [place] [address] [phone]              Who to contact     Please call your clinic at [phone] to:    Ask questions about your health    Make or cancel appointments    Discuss your medicines    Learn about your test results    Speak to your doctor   If you have compliments or concerns about an experience at your clinic, or if you wish to file a complaint, please contact [facility] Patient Relations at [phone] or email us at [email or web address]         Additional Information About Your Visit        ThePresent.Cohart Information     [employer] CRM[employer] gives you secure access to your electronic health record. If you see a primary care provider, you can also send messages to your care team and make appointments. If you have questions, please call your primary care clinic.  If you do not have a primary care provider, please call [phone] and they will assist you.      [employer] is an electronic gateway  that provides easy, online access to your medical records. With [employer], you can request a clinic appointment, read your test results, renew a prescription or communicate with your care team.     To access your existing account, please contact your [facility] or call [phone] for assistance.        Care EveryWhere ID     This is your Care EveryWhere ID. This could be used by other organizations to access your [place] medical records  [id number]         Blood Pressure from Last 3 Encounters:   12/11/17 112/70   10/20/17 124/76   01/06/17 144/84    Weight from Last 3 Encounters:   12/11/17 74.8 kg (165 lb)   10/20/17 77.7 kg (171 lb 6.4 oz)   01/06/17 77.8 kg (171 lb 8 oz)              We Performed the Following     POST-VOID RESIDUAL BLADDER SCAN        Primary Care Provider Fax #    Provider Not In System [phone]                Equal Access to Services     JI WISE : Hadii aad ku hadasho Sofanny, waaxda luqadaha, qaybta kaalmada adeegyada, elza smallwood . So [facility] [phone].    ATENCIÓN: Si habla español, tiene a caal disposición servicios gratuitos de asistencia lingüística. Nicholas al [phone].    We comply with applicable federal civil rights laws and [place] laws. We do not discriminate on the basis of race, color, national origin, age, disability, sex, sexual orientation, or gender identity.            Thank you!     Thank you for choosing [facility] AND [facility] FOR PROSTATE AND UROLOGIC CANCERS  for your care. Our goal is always to provide you with excellent care. Hearing back from our patients is one way we can continue to improve our services. Please take a few minutes to complete the written survey that you may receive in the mail after your visit with us. Thank you!             Your Updated Medication List - Protect others around you: Learn how to safely use, store and throw away your medicines at www.disposemymeds.org.           This list is accurate as of: 12/14/17  9:41 AM.  Always use your most recent med list.                   Brand Name Dispense Instructions for use Diagnosis    alfuzosin 10 MG 24 hr tablet    UROXATRAL    90 tablet    TAKE ONE TABLET BY MOUTH EVERY DAY    Nocturia       ciprofloxacin 500 MG tablet    CIPRO    6 tablet    Take 1 tablet (500 mg) by mouth 2 times daily    Dysuria       lidocaine 2 %    URO-JET    5 Syringe    Place 10 mLs into the urethra 2 times daily as needed for moderate pain    Benign prostatic hyperplasia with lower urinary tract symptoms, symptom details unspecified       oxyCODONE IR 5 MG tablet    ROXICODONE    10 tablet    Take 1 tablet (5 mg) by mouth every 4 hours as needed for pain    Benign prostatic hyperplasia with lower urinary tract symptoms, symptom details unspecified

## 2018-02-26 ENCOUNTER — PRE VISIT (OUTPATIENT)
Dept: UROLOGY | Facility: CLINIC | Age: 63
End: 2018-02-26

## 2018-02-26 NOTE — TELEPHONE ENCOUNTER
Patient is coming in to see  for 3 month f/u for Rezume, called patient and left a message to please come with a full bladder for a flow/PVR

## 2018-03-16 ENCOUNTER — OFFICE VISIT (OUTPATIENT)
Dept: UROLOGY | Facility: CLINIC | Age: 63
End: 2018-03-16
Payer: COMMERCIAL

## 2018-03-16 VITALS
WEIGHT: 173.9 LBS | DIASTOLIC BLOOD PRESSURE: 71 MMHG | SYSTOLIC BLOOD PRESSURE: 128 MMHG | BODY MASS INDEX: 25.76 KG/M2 | HEIGHT: 69 IN | HEART RATE: 41 BPM

## 2018-03-16 DIAGNOSIS — R35.0 URINARY FREQUENCY: Primary | ICD-10-CM

## 2018-03-16 ASSESSMENT — PAIN SCALES - GENERAL: PAINLEVEL: NO PAIN (0)

## 2018-03-16 NOTE — MR AVS SNAPSHOT
After Visit Summary   3/16/2018    Damian Brooks    MRN: [id number]           Patient Information     Date Of Birth          1955        Visit Information        Provider Department      3/16/2018 11:15 AM Liliam Merritt MD [facility] Urology and [facility] for Prostate and Urologic Cancers        Care Instructions    Please follow up in 6 weeks with a nurse to do Flow/PVR    It was a pleasure meeting with you today.  Thank you for allowing me and my team the privilege of caring for you today.  YOU are the reason we are here, and I truly hope we provided you with the excellent service you deserve.  Please let us know if there is anything else we can do for you so that we can be sure you are leaving completely satisfied with your care experience.                  Follow-ups after your visit        Your next 10 appointments already scheduled     Apr 23, 2018  1:00 PM CDT   (Arrive by 12:45 PM)   Return Visit with  Prostate Cancer Ctr Nurse   [facility] Urology and [facility] for Prostate and Urologic Cancers ([facility])    [address] [place] [id number] [phone]              Who to contact     Please call your clinic at [phone] to:    Ask questions about your health    Make or cancel appointments    Discuss your medicines    Learn about your test results    Speak to your doctor            Additional Information About Your Visit        Spindle Information     Spindle gives you secure access to your electronic health record. If you see a primary care provider, you can also send messages to your care team and make appointments. If you have questions, please call your primary care clinic.  If you do not have a primary care provider, please call [phone] and they will assist you.      Spindle is an electronic gateway that provides easy, online access to your medical records. With Spindle, you can request a clinic appointment, read your  "test results, renew a prescription or communicate with your care team.     To access your existing account, please contact your [facility] or call [phone] for assistance.        Care EveryWhere ID     This is your Care EveryWhere ID. This could be used by other organizations to access your [facility] medical records  [id number]        Your Vitals Were     Pulse Height BMI (Body Mass Index)             41 1.753 m (5' 9\") 25.68 kg/m2          Blood Pressure from Last 3 Encounters:   03/16/18 128/71   12/11/17 112/70   10/20/17 124/76    Weight from Last 3 Encounters:   03/16/18 78.9 kg (173 lb 14.4 oz)   12/11/17 74.8 kg (165 lb)   10/20/17 77.7 kg (171 lb 6.4 oz)              Today, you had the following     No orders found for display       Primary Care Provider Fax #    Provider Not In System [phone]                Equal Access to Services     JI WISE : Hadii chemo lopez hadasho Soomaali, waaxda luqadaha, qaybta kaalmada adeegyada, elza smallwood . So [facility] [phone].    ATENCIÓN: Si habla español, tiene a caal disposición servicios gratuitos de asistencia lingüística. Llame al [phone].    We comply with applicable federal civil rights laws and [place] laws. We do not discriminate on the basis of race, color, national origin, age, disability, sex, sexual orientation, or gender identity.            Thank you!     Thank you for choosing [facility] UROLOGY AND [facility] FOR PROSTATE AND UROLOGIC CANCERS  for your care. Our goal is always to provide you with excellent care. Hearing back from our patients is one way we can continue to improve our services. Please take a few minutes to complete the written survey that you may receive in the mail after your visit with us. Thank you!             Your Updated Medication List - Protect others around you: Learn how to safely use, store and throw away your medicines at www.disposemymeds.org.      Notice  As of " 3/16/2018 12:08 PM    You have not been prescribed any medications.

## 2018-03-16 NOTE — LETTER
3/16/2018       RE: Damian Brooks  [address] [place] [address]     Dear Colleague,    Thank you for referring your patient, Damian Brooks, to the [facility] UROLOGY AND INST FOR PROSTATE AND UROLOGIC CANCERS at [facility]. Please see a copy of my visit note below.    UROLOGIC DIAGNOSES:       CURRENT INTERVENTIONS:       HISTORY:   Patient presents with a history of lower urinary tract symptoms       Nocturia 3-5x   Frequency, urgency, no UUI   Had discussed green light laser vaporization and uro-lift with previous urologist.     Patient has tried tamsulosin in the past and noted no difference in symptoms. Also did not like decreased ejaculate.       Patient is currently s/p Rezum complicated by prolonged hematuria post op. He notes that he has had some improvement in his lower urinary tract symptoms overall but is not as satisfied as he had hoped.     We reviewed post op complications and timeline for improvement. Also discussed possible UDS, possible anticholinergic rx should symptoms continue.     Of note, uroflow was unreliable today.     PAST MEDICAL HISTORY: No past medical history on file.    PAST SURGICAL HISTORY:   Past Surgical History:   Procedure Laterality Date     CYSTOSCOPY N/A 12/11/2017    Procedure: CYSTOSCOPY;  Cystoscopy, Rezum Steam Vaporization of the Prostate;  Surgeon: Liliam Merritt MD;  Location: [facility] OR     ENT SURGERY       TRANSURETHERAL DESTRUCTION OF PROSTATE BY THERMOTHERAPY N/A 12/11/2017    Procedure: TRANSURETHERAL DESTRUCTION OF PROSTATE BY THERMOTHERAPY;;  Surgeon: Liliam Merritt MD;  Location:  OR       FAMILY HISTORY: No family history on file.    SOCIAL HISTORY:   Social History   Substance Use Topics     Smoking status: Never Smoker     Smokeless tobacco: Never Used     Alcohol use Not on file       No current outpatient prescriptions on file.     No current facility-administered medications for  "this visit.          PHYSICAL EXAM:    /71  Pulse (!) 41  Ht 1.753 m (5' 9\")  Wt 78.9 kg (173 lb 14.4 oz)  BMI 25.68 kg/m2    HEENT: Normocephalic and atraumatic   Cardiac: Not done  Back/Flank: Not done  CNS/PNS: Not done  Respiratory: Normal non-labored breathing  Abdomen: Soft nontender and nondistended  Peripheral Vascular: Not done  Mental Status: Not done    Penis: Not done  Scrotal Skin: Not done  Testicles: Not done  Epididymis: Not done  Digital Rectal Exam:     Cystoscopy: Not done    Imaging: None    Urinalysis: UA RESULTS:  No results for input(s): COLOR, APPEARANCE, URINEGLC, URINEBILI, URINEKETONE, SG, UBLD, URINEPH, PROTEIN, UROBILINOGEN, NITRITE, LEUKEST, RBCU, WBCU in the last 40939 hours.    PSA:     Post Void Residual:     Other labs: None today      IMPRESSION:  62 y/o M with history of lower urinary tract symptoms with minimal response to tamsulosin currently s/p Rezum     PLAN:  Follow up with nurse visit in six weeks for uroflow/PVR   Will consider further options after uroflow/PVR results available     Total Time: 15 minutes                                      Total in Consultation: greater than 50%           Again, thank you for allowing me to participate in the care of your patient.      Sincerely,    Liliam Merritt MD      "

## 2018-03-16 NOTE — NURSING NOTE
"Chief Complaint   Patient presents with     RECHECK     Rezume follow up - flow/pvr       Blood pressure 128/71, pulse (!) 41, height 1.753 m (5' 9\"), weight 78.9 kg (173 lb 14.4 oz). Body mass index is 25.68 kg/(m^2).    There is no problem list on file for this patient.      Allergies   Allergen Reactions     Codeine Nausea and Vomiting       No current outpatient prescriptions on file.       Social History   Substance Use Topics     Smoking status: Never Smoker     Smokeless tobacco: Never Used     Alcohol use Not on file       AYESHA Henao  3/16/2018  11:25 AM       "

## 2018-03-16 NOTE — PATIENT INSTRUCTIONS
Please follow up in 6 weeks with a nurse to do Flow/PVR    It was a pleasure meeting with you today.  Thank you for allowing me and my team the privilege of caring for you today.  YOU are the reason we are here, and I truly hope we provided you with the excellent service you deserve.  Please let us know if there is anything else we can do for you so that we can be sure you are leaving completely satisfied with your care experience.

## 2018-04-18 ENCOUNTER — PRE VISIT (OUTPATIENT)
Dept: UROLOGY | Facility: CLINIC | Age: 63
End: 2018-04-18

## 2018-04-27 ENCOUNTER — ALLIED HEALTH/NURSE VISIT (OUTPATIENT)
Dept: UROLOGY | Facility: CLINIC | Age: 63
End: 2018-04-27
Payer: COMMERCIAL

## 2018-04-27 DIAGNOSIS — R39.9 LOWER URINARY TRACT SYMPTOMS (LUTS): Primary | ICD-10-CM

## 2018-04-27 NOTE — MR AVS SNAPSHOT
After Visit Summary   4/27/2018    Damian Brooks    MRN: [id number]           Patient Information     Date Of Birth          1955        Visit Information        Provider Department      4/27/2018 1:00 PM Nurse, Angela Prostate Cancer [facility] Urology and [facility] for Prostate and Urologic Cancers        Today's Diagnoses     Lower urinary tract symptoms (LUTS)    -  1       Follow-ups after your visit        Who to contact     Please call your clinic at [phone] to:    Ask questions about your health    Make or cancel appointments    Discuss your medicines    Learn about your test results    Speak to your doctor            Additional Information About Your Visit        MyChart Information     [employer] gives you secure access to your electronic health record. If you see a primary care provider, you can also send messages to your care team and make appointments. If you have questions, please call your primary care clinic.  If you do not have a primary care provider, please call [phone] and they will assist you.      [employer] is an electronic gateway that provides easy, online access to your medical records. With [employer], you can request a clinic appointment, read your test results, renew a prescription or communicate with your care team.     To access your existing account, please contact your [facility] or call [phone] for assistance.        Care EveryWhere ID     This is your Care EveryWhere ID. This could be used by other organizations to access your [facility] medical records  [id number]         Blood Pressure from Last 3 Encounters:   03/16/18 128/71   12/11/17 112/70   10/20/17 124/76    Weight from Last 3 Encounters:   03/16/18 78.9 kg (173 lb 14.4 oz)   12/11/17 74.8 kg (165 lb)   10/20/17 77.7 kg (171 lb 6.4 oz)              Today, you had the following     No orders found for display       Primary Care Provider Fax #    Provider Not In System  [phone]                Equal Access to Services     [facility] CenterRENEE : Hadii aad ku hadanantshahriar Jesus, wajannalalo cortés, deepakelza lo. So [facility] [phone].    ATENCIÓN: Si habla español, tiene a caal disposición servicios gratuitos de asistencia lingüística. Llame al [phone].    We comply with applicable federal civil rights laws and [place] laws. We do not discriminate on the basis of race, color, national origin, age, disability, sex, sexual orientation, or gender identity.            Thank you!     Thank you for choosing [facility] UROLOGY AND [facility] FOR PROSTATE AND UROLOGIC CANCERS  for your care. Our goal is always to provide you with excellent care. Hearing back from our patients is one way we can continue to improve our services. Please take a few minutes to complete the written survey that you may receive in the mail after your visit with us. Thank you!             Your Updated Medication List - Protect others around you: Learn how to safely use, store and throw away your medicines at www.disposemymeds.org.      Notice  As of 4/27/2018  1:37 PM    You have not been prescribed any medications.

## 2018-04-27 NOTE — NURSING NOTE
Damian Brooks comes into clinic today at the request of Dr. Merritt Ordering Provider for Flow/PVR    Patients flow sheet Data was the following:    Flow time: 63.0 sec    Volume: 378 mL    Qmax: 11.9 mL/s    Qmean: 6.0 mL/s    TQmax: 9.0 sec     Second PVR: 64 mL    This service provided today was under the supervising provider of the day Dr. Merritt, who was available if needed.    Rebeca Farr

## 2019-10-04 ENCOUNTER — HEALTH MAINTENANCE LETTER (OUTPATIENT)
Age: 64
End: 2019-10-04

## 2020-11-08 ENCOUNTER — HEALTH MAINTENANCE LETTER (OUTPATIENT)
Age: 65
End: 2020-11-08

## 2021-09-11 ENCOUNTER — HEALTH MAINTENANCE LETTER (OUTPATIENT)
Age: 66
End: 2021-09-11

## 2022-01-01 ENCOUNTER — HEALTH MAINTENANCE LETTER (OUTPATIENT)
Age: 67
End: 2022-01-01

## 2022-10-30 ENCOUNTER — HEALTH MAINTENANCE LETTER (OUTPATIENT)
Age: 67
End: 2022-10-30

## 2023-04-08 ENCOUNTER — HEALTH MAINTENANCE LETTER (OUTPATIENT)
Age: 68
End: 2023-04-08

## (undated) DEVICE — SUCTION MANIFOLD NEPTUNE 2 SYS 4 PORT 0702-020-000

## (undated) DEVICE — Device

## (undated) DEVICE — PAD CHUX UNDERPAD 23X24" 7136

## (undated) DEVICE — BAG URINARY DRAIN LUBRISIL IC 4000ML LF 253509A

## (undated) DEVICE — EVACUATOR BLADDER UROVAC LATEX M0067301250

## (undated) DEVICE — CATH FOLEY 3WAY 22FR 30ML LATEX 0167SI22

## (undated) DEVICE — PACK CYSTO CUSTOM ASC

## (undated) DEVICE — SOL NACL 0.9% IRRIG 3000ML BAG 2B7477

## (undated) DEVICE — ESU GROUND PAD UNIVERSAL W/O CORD

## (undated) DEVICE — PAD CHUX UNDERPAD 30X30"

## (undated) DEVICE — GLOVE PROTEXIS W/NEU-THERA 6.5  2D73TE65

## (undated) DEVICE — LINEN TOWEL PACK X5 5464

## (undated) DEVICE — SOL WATER IRRIG 1000ML BOTTLE 2F7114

## (undated) DEVICE — DRAPE GYN/UROLOGY FLUID POUCH TUR 29455

## (undated) DEVICE — CATH FOLEY 18FR 5ML SILVER COAT SIL LUBRISIL 1758SI18

## (undated) RX ORDER — GABAPENTIN 300 MG/1
CAPSULE ORAL
Status: DISPENSED
Start: 2017-12-11

## (undated) RX ORDER — CIPROFLOXACIN 500 MG/1
TABLET, FILM COATED ORAL
Status: DISPENSED
Start: 2017-12-14

## (undated) RX ORDER — LIDOCAINE HYDROCHLORIDE 20 MG/ML
INJECTION, SOLUTION EPIDURAL; INFILTRATION; INTRACAUDAL; PERINEURAL
Status: DISPENSED
Start: 2017-12-11

## (undated) RX ORDER — FENTANYL CITRATE 50 UG/ML
INJECTION, SOLUTION INTRAMUSCULAR; INTRAVENOUS
Status: DISPENSED
Start: 2017-12-11

## (undated) RX ORDER — ACETAMINOPHEN 325 MG/1
TABLET ORAL
Status: DISPENSED
Start: 2017-12-11

## (undated) RX ORDER — ONDANSETRON 2 MG/ML
INJECTION INTRAMUSCULAR; INTRAVENOUS
Status: DISPENSED
Start: 2017-12-11

## (undated) RX ORDER — PROPOFOL 10 MG/ML
INJECTION, EMULSION INTRAVENOUS
Status: DISPENSED
Start: 2017-12-11